# Patient Record
(demographics unavailable — no encounter records)

---

## 2025-07-28 NOTE — PHYSICAL EXAM
[Alert] : alert [Normocephalic] : normocephalic [Flat Open Anterior [place]] : flat open anterior fontanelle [PERRL] : PERRL [Red Reflex Bilateral] : red reflex bilateral [Normally Placed Ears] : normally placed ears [Auricles Well Formed] : auricles well formed [Clear Tympanic membranes] : clear tympanic membranes [Light reflex present] : light reflex present [Bony structures visible] : bony structures visible [Patent Auditory Canal] : patent auditory canal [Nares Patent] : nares patent [Palate Intact] : palate intact [Uvula Midline] : uvula midline [Supple, full passive range of motion] : supple, full passive range of motion [Symmetric Chest Rise] : symmetric chest rise [Clear to Auscultation Bilaterally] : clear to auscultation bilaterally [Regular Rate and Rhythm] : regular rate and rhythm [S1, S2 present] : S1, S2 present [+2 Femoral Pulses] : +2 femoral pulses [Soft] : soft [Bowel Sounds] : bowel sounds present [Umbilical Stump Dry, Clean, Intact] : umbilical stump dry, clean, intact [Normal external genitailia] : normal external genitalia [Central Urethral Opening] : central urethral opening [Testicles Descended Bilaterally] : testicles descended bilaterally [Patent] : patent [Normally Placed] : normally placed [No Abnormal Lymph Nodes Palpated] : no abnormal lymph nodes palpated [Symmetric Flexed Extremities] : symmetric flexed extremities [Startle Reflex] : startle reflex present [Suck Reflex] : suck reflex present [Rooting] : rooting reflex present [Palmar Grasp] : palmar grasp present [Plantar Grasp] : plantar reflex present [Symmetric [place]] : symmetric [place] [Jaundice] : jaundice [Acute Distress] : no acute distress [Icteric sclera] : nonicteric sclera [Discharge] : no discharge [Palpable Masses] : no palpable masses [Murmurs] : no murmurs [Breast Tissue] : no prominent breast tissue [Tender] : nontender [Distended] : not distended [Hepatomegaly] : no hepatomegaly [Splenomegaly] : no splenomegaly [Leonardo-Ortolani] : negative Leonardo-Ortolani [Spinal Dimple] : no spinal dimple [Tuft of Hair] : no tuft of hair [FreeTextEntry1] : NAD alert well looking [FreeTextEntry5] : RR++  [de-identified] : palate intact [FreeTextEntry8] : RR no murmur [FreeTextEntry9] : soft NT ND nl abd. see skin note [FreeTextEntry6] : nl circ healing well. testes desc bilat, No rash on  area [de-identified] : Leonardo/Ortolani normal, Galeazzi test normal.  Leg length equal, creases symmetrical, no hip click or clunk. No MA or ITT.  [de-identified] : nl [de-identified] : several pink tiny papules lower abdomen, with a few tiny white pustules on lower abd, one upper abd.  Not vesicles.

## 2025-07-28 NOTE — DISCUSSION/SUMMARY
[Normal Growth] : growth [Normal Development] : developmental [No Elimination Concerns] : elimination [Continue Regimen] : feeding [Normal Sleep Pattern] : sleep [None] : no known medical problems [Anticipatory Guidance Given] : Anticipatory guidance addressed as per the history of present illness section [No Vaccines] : no vaccines needed [No Medications] : ~He/She~ is not on any medications [Parent/Guardian] : Parent/Guardian [de-identified] : rash [FreeTextEntry1] : Well looking   Tiny white pustules on abd, look benign.  Not vesicles, mother no HSV hx acc to parents. Do not look like impetigo at this time.  Likely  pustular melanosis.   One unroofed and  sent for HSV PCR. Two unroofed and sent for bacterial cx.   Advised to put mupirocin ointment qid on rash.   RTO at once if worse or spreads.  Discussed rectal thermometer, to ER immediately if temp 100.4 or higher. If looks sick go to ER immediataely.   [id number] rash and cultures etc, 20 mins  Reviewed pt with Dr Brown, return to see her in 3 days.  RTO earlier if worse.

## 2025-07-28 NOTE — PHYSICAL EXAM
[Alert] : alert [Normocephalic] : normocephalic [Flat Open Anterior [place]] : flat open anterior fontanelle [PERRL] : PERRL [Red Reflex Bilateral] : red reflex bilateral [Normally Placed Ears] : normally placed ears [Auricles Well Formed] : auricles well formed [Clear Tympanic membranes] : clear tympanic membranes [Light reflex present] : light reflex present [Bony structures visible] : bony structures visible [Patent Auditory Canal] : patent auditory canal [Nares Patent] : nares patent [Palate Intact] : palate intact [Uvula Midline] : uvula midline [Supple, full passive range of motion] : supple, full passive range of motion [Symmetric Chest Rise] : symmetric chest rise [Clear to Auscultation Bilaterally] : clear to auscultation bilaterally [Regular Rate and Rhythm] : regular rate and rhythm [S1, S2 present] : S1, S2 present [+2 Femoral Pulses] : +2 femoral pulses [Soft] : soft [Bowel Sounds] : bowel sounds present [Umbilical Stump Dry, Clean, Intact] : umbilical stump dry, clean, intact [Normal external genitailia] : normal external genitalia [Central Urethral Opening] : central urethral opening [Testicles Descended Bilaterally] : testicles descended bilaterally [Patent] : patent [Normally Placed] : normally placed [No Abnormal Lymph Nodes Palpated] : no abnormal lymph nodes palpated [Symmetric Flexed Extremities] : symmetric flexed extremities [Startle Reflex] : startle reflex present [Suck Reflex] : suck reflex present [Rooting] : rooting reflex present [Palmar Grasp] : palmar grasp present [Plantar Grasp] : plantar reflex present [Symmetric Kizzy] : symmetric [place] [Jaundice] : jaundice [Acute Distress] : no acute distress [Icteric sclera] : nonicteric sclera [Discharge] : no discharge [Palpable Masses] : no palpable masses [Murmurs] : no murmurs [Breast Tissue] : no prominent breast tissue [Tender] : nontender [Distended] : not distended [Hepatomegaly] : no hepatomegaly [Splenomegaly] : no splenomegaly [Leonardo-Ortolani] : negative Leonardo-Ortolani [Spinal Dimple] : no spinal dimple [Tuft of Hair] : no tuft of hair [FreeTextEntry1] : NAD alert well looking [FreeTextEntry5] : RR++  [de-identified] : palate intact [FreeTextEntry8] : RR no murmur [FreeTextEntry9] : soft NT ND nl abd. see skin note [FreeTextEntry6] : nl circ healing well. testes desc bilat, No rash on  area [de-identified] : Leonardo/Ortolani normal, Galeazzi test normal.  Leg length equal, creases symmetrical, no hip click or clunk. No MA or ITT.  [de-identified] : nl [de-identified] : several pink tiny papules lower abdomen, with a few tiny white pustules on lower abd, one upper abd.  Not vesicles.

## 2025-07-28 NOTE — DISCUSSION/SUMMARY
[Normal Growth] : growth [Normal Development] : developmental [No Elimination Concerns] : elimination [Continue Regimen] : feeding [Normal Sleep Pattern] : sleep [None] : no known medical problems [Anticipatory Guidance Given] : Anticipatory guidance addressed as per the history of present illness section [No Vaccines] : no vaccines needed [No Medications] : ~He/She~ is not on any medications [Parent/Guardian] : Parent/Guardian [de-identified] : rash [FreeTextEntry1] : Well looking   Tiny white pustules on abd, look benign.  Not vesicles, mother no HSV hx acc to parents. Do not look like impetigo at this time.  Likely  pustular melanosis.   One unroofed and  sent for HSV PCR. Two unroofed and sent for bacterial cx.   Advised to put mupirocin ointment qid on rash.   RTO at once if worse or spreads.  Discussed rectal thermometer, to ER immediately if temp 100.4 or higher. If looks sick go to ER immediataely.   [id number] rash and cultures etc, 20 mins  Reviewed pt with Dr Brown, return to see her in 3 days.  RTO earlier if worse.

## 2025-07-28 NOTE — HISTORY OF PRESENT ILLNESS
[Breast milk] : breast milk [Normal] : Normal [No] : No cigarette smoke exposure [Water heater temperature set at <120 degrees F] : Water heater temperature set at <120 degrees F [Rear facing car seat in back seat] : Rear facing car seat in back seat [Carbon Monoxide Detectors] : Carbon monoxide detectors at home [Smoke Detectors] : Smoke detectors at home. [Exposure to electronic nicotine delivery system] : No exposure to electronic nicotine delivery system [FreeTextEntry1] : Parents 5 day old BW 3310 g (7-4) DW 3220 g. 7-1.  -2.7% 39.5 wks 2 day hosp stay. AGA Mother 30 yr old ,  2 yr old brother, healthy.  FH negative acc to parents.  . Peds called CAT 2 tracing and meconium. Prenatal hx nl.  PNL nl, GBS neg.   Mother B+.  Screens passed.  APGARS 8/9 Hep B given . Had circ.  Discharge Tc Bili 6.8   Breast feeding, now taking well.  Enfamil. Now only breast feeding.